# Patient Record
Sex: FEMALE | Race: OTHER | NOT HISPANIC OR LATINO | Employment: FULL TIME | URBAN - METROPOLITAN AREA
[De-identification: names, ages, dates, MRNs, and addresses within clinical notes are randomized per-mention and may not be internally consistent; named-entity substitution may affect disease eponyms.]

---

## 2022-09-06 ENCOUNTER — TELEPHONE (OUTPATIENT)
Dept: HEMATOLOGY/ONCOLOGY | Facility: CLINIC | Age: 32
End: 2022-09-06
Payer: COMMERCIAL

## 2022-09-06 NOTE — TELEPHONE ENCOUNTER
Attempted to call pt with the assistance of  FACM & discuss appt scheduled later this week. No answer, no voicemail.  notified that we are working on it.

## 2022-09-08 NOTE — TELEPHONE ENCOUNTER
First call, no answer. (ID [id number])tried a 2nd time & pt answered. Explained that  does not treat her type of cancer & that we needed to schedule with a different provider. Pt was concerned because she doesn't speak English. Informed her that we will request an  for her visit. Scheduled with  on 9/21/22 at 10am. Also provided my email so that she can send me the records she has & we can submit them to the International [facility] to interpret for the visit. Pt states she does not have any family or friends that can accompany her & they do not speak English either. All questions answered.

## 2022-09-21 ENCOUNTER — TELEPHONE (OUTPATIENT)
Dept: OTOLARYNGOLOGY | Facility: CLINIC | Age: 32
End: 2022-09-21
Payer: COMMERCIAL

## 2022-09-21 ENCOUNTER — OFFICE VISIT (OUTPATIENT)
Dept: HEMATOLOGY/ONCOLOGY | Facility: CLINIC | Age: 32
End: 2022-09-21
Payer: COMMERCIAL

## 2022-09-21 VITALS
RESPIRATION RATE: 18 BRPM | OXYGEN SATURATION: 96 % | DIASTOLIC BLOOD PRESSURE: 60 MMHG | TEMPERATURE: 98 F | WEIGHT: 92.13 LBS | SYSTOLIC BLOOD PRESSURE: 123 MMHG | HEART RATE: 72 BPM

## 2022-09-21 DIAGNOSIS — Z76.89 ENCOUNTER TO ESTABLISH CARE: ICD-10-CM

## 2022-09-21 DIAGNOSIS — Z85.819 HISTORY OF NASOPHARYNGEAL CANCER: Primary | ICD-10-CM

## 2022-09-21 PROCEDURE — 3074F SYST BP LT 130 MM HG: CPT | Mod: CPTII,S$GLB,, | Performed by: STUDENT IN AN ORGANIZED HEALTH CARE EDUCATION/TRAINING PROGRAM

## 2022-09-21 PROCEDURE — 99999 PR PBB SHADOW E&M-EST. PATIENT-LVL III: ICD-10-PCS | Mod: PBBFAC,,, | Performed by: STUDENT IN AN ORGANIZED HEALTH CARE EDUCATION/TRAINING PROGRAM

## 2022-09-21 PROCEDURE — 99999 PR PBB SHADOW E&M-EST. PATIENT-LVL III: CPT | Mod: PBBFAC,,, | Performed by: STUDENT IN AN ORGANIZED HEALTH CARE EDUCATION/TRAINING PROGRAM

## 2022-09-21 PROCEDURE — 3074F PR MOST RECENT SYSTOLIC BLOOD PRESSURE < 130 MM HG: ICD-10-PCS | Mod: CPTII,S$GLB,, | Performed by: STUDENT IN AN ORGANIZED HEALTH CARE EDUCATION/TRAINING PROGRAM

## 2022-09-21 PROCEDURE — 99205 OFFICE O/P NEW HI 60 MIN: CPT | Mod: S$GLB,,, | Performed by: STUDENT IN AN ORGANIZED HEALTH CARE EDUCATION/TRAINING PROGRAM

## 2022-09-21 PROCEDURE — 3078F PR MOST RECENT DIASTOLIC BLOOD PRESSURE < 80 MM HG: ICD-10-PCS | Mod: CPTII,S$GLB,, | Performed by: STUDENT IN AN ORGANIZED HEALTH CARE EDUCATION/TRAINING PROGRAM

## 2022-09-21 PROCEDURE — 99205 PR OFFICE/OUTPT VISIT, NEW, LEVL V, 60-74 MIN: ICD-10-PCS | Mod: S$GLB,,, | Performed by: STUDENT IN AN ORGANIZED HEALTH CARE EDUCATION/TRAINING PROGRAM

## 2022-09-21 PROCEDURE — 3078F DIAST BP <80 MM HG: CPT | Mod: CPTII,S$GLB,, | Performed by: STUDENT IN AN ORGANIZED HEALTH CARE EDUCATION/TRAINING PROGRAM

## 2022-09-21 NOTE — PROGRESS NOTES
Hematology- Oncology Clinic Note :     9/21/2022    Chief Complaint   Patient presents with    Advice Only    Establish Care    nasopharyngeal tumor     Cantonese  used during visit      Reason for referral: Surveillance for nasopharyngeal tumor      HPI    Pt is a 33 yo Cantonese speaking female with pmhx of nasopharyngeal tumor (unknown stage and pathology) who presents as a referral from PCP to establish care for oncology surveillance.  Per pt she was treated in [place] after she was diagnosed with the nasopharyngeal tumor around August 2017.  Per pt she had 3 months of chemo and XRT and was on surveillance until she left [facility] in 2020.  Few records have been received (in media) but pt states she has not been informed of a recurrence and denied any alarm symptoms.  [place] has not [place] in [place] due [place] concerns but would like to re establish follow up.   was used during visit.  Pt agreeable to ENT referral but informed that we will need additional records from [place] in order to have a better understanding of how we will monitor her as we do not have any path or staging/tx information.  Pt initially agreeable for us to obtain records but later refused as she did not want to give out additional information despite being informed that it is necessary to obtain these records as it could affect our tx or surveillance plan.  Pt agreeable for ENT follow up so far.          Pmhx: As above  Fmhx: Lung cancer  Social Hx: Denies  Surgical hx: Denies    Active Problem List with Overview Notes    Diagnosis Date Noted    Encounter to establish care 09/21/2022       Patient Active Problem List    Diagnosis Date Noted    Encounter to establish care 09/21/2022     No past medical history on file.   No past surgical history on file.   (Not in a hospital admission)    Review of patient's allergies indicates:  Not on File   Social History     Tobacco Use    Smoking status: Not on file     Smokeless tobacco: Not on file   Substance Use Topics    Alcohol use: Not on file      No family history on file.     Review of Systems :  Review of Systems   Constitutional:  Negative for chills, diaphoresis, fever, malaise/fatigue and weight loss.   HENT:  Negative for congestion, hearing loss and nosebleeds.    Eyes:  Negative for blurred vision, double vision, pain, discharge and redness.   Respiratory:  Negative for cough and shortness of breath.    Cardiovascular:  Negative for chest pain.   Gastrointestinal:  Negative for abdominal pain and heartburn.   Genitourinary:  Negative for dysuria.   Musculoskeletal:  Negative for joint pain and myalgias.   Skin:  Negative for itching and rash.   Neurological:  Negative for dizziness and headaches.   Endo/Heme/Allergies:  Does not bruise/bleed easily.   Psychiatric/Behavioral:  The patient is nervous/anxious.      Physical Exam :  Wt Readings from Last 3 Encounters:   09/21/22 41.8 kg (92 lb 2.4 oz)     Temp Readings from Last 3 Encounters:   09/21/22 98.4 °F (36.9 °C) (Oral)     BP Readings from Last 3 Encounters:   09/21/22 123/60     Pulse Readings from Last 3 Encounters:   09/21/22 72     There is no height or weight on file to calculate BMI.    Physical Exam  Vitals reviewed.   Constitutional:       Appearance: Normal appearance.   HENT:      Head: Normocephalic and atraumatic.      Nose: Nose normal.      Mouth/Throat:      Mouth: Mucous membranes are moist.   Eyes:      General: No scleral icterus.        Right eye: No discharge.         Left eye: No discharge.      Pupils: Pupils are equal, round, and reactive to light.   Cardiovascular:      Rate and Rhythm: Normal rate and regular rhythm.      Heart sounds: Normal heart sounds.   Pulmonary:      Effort: Pulmonary effort is normal.      Breath sounds: Normal breath sounds.   Abdominal:      General: Abdomen is flat.      Palpations: Abdomen is soft.   Musculoskeletal:         General: Normal range of motion.       Cervical back: Normal range of motion and neck supple.   Skin:     General: Skin is warm and dry.   Neurological:      Mental Status: She is oriented to person, place, and time.   Psychiatric:         Mood and Affect: Mood normal.         Behavior: Behavior normal.         Thought Content: Thought content normal.         Judgment: Judgment normal.         Pertinent Diagnostic studies:    No results found for this or any previous visit (from the past 24 hour(s)).    Assessment/Plan :     ECOG 0    Hx of nasopharyngeal tumor  -Dx and tx with chemo/XRT in [place] (unknown stage and tx) in Aug 2017 per pt with 1  -Was on surveillance until [place] when [place] move to [place]  -Few records available (in media) but no evidence of disease on CT on 2/2020 but significant for small nodules in lungs  -Pt refuses to have us obtain additional records for now despite being told of the importance of obtaining records but agreeable to ENT follow up  -RTC in 3 weeks for MD visit    Summary of orders placed this encounter:  Orders Placed This Encounter   Procedures    Ambulatory referral/consult to ENT       No future appointments.        Jeanette Khanna MD   Hematology/oncology

## 2023-04-20 ENCOUNTER — OFFICE VISIT (OUTPATIENT)
Dept: HEMATOLOGY/ONCOLOGY | Facility: CLINIC | Age: 33
End: 2023-04-20
Payer: COMMERCIAL

## 2023-04-20 VITALS
HEART RATE: 67 BPM | DIASTOLIC BLOOD PRESSURE: 59 MMHG | WEIGHT: 92.81 LBS | OXYGEN SATURATION: 100 % | BODY MASS INDEX: 17.52 KG/M2 | SYSTOLIC BLOOD PRESSURE: 113 MMHG | HEIGHT: 61 IN

## 2023-04-20 DIAGNOSIS — K08.9 POOR DENTITION: ICD-10-CM

## 2023-04-20 DIAGNOSIS — Z09 FOLLOW-UP EXAM: ICD-10-CM

## 2023-04-20 DIAGNOSIS — Z85.819 HISTORY OF NASOPHARYNGEAL CANCER: Primary | ICD-10-CM

## 2023-04-20 DIAGNOSIS — H65.22 LEFT CHRONIC SEROUS OTITIS MEDIA: ICD-10-CM

## 2023-04-20 PROCEDURE — 99214 OFFICE O/P EST MOD 30 MIN: CPT | Mod: S$GLB,,, | Performed by: STUDENT IN AN ORGANIZED HEALTH CARE EDUCATION/TRAINING PROGRAM

## 2023-04-20 PROCEDURE — 3078F DIAST BP <80 MM HG: CPT | Mod: CPTII,S$GLB,, | Performed by: STUDENT IN AN ORGANIZED HEALTH CARE EDUCATION/TRAINING PROGRAM

## 2023-04-20 PROCEDURE — 99999 PR PBB SHADOW E&M-EST. PATIENT-LVL III: ICD-10-PCS | Mod: PBBFAC,,, | Performed by: STUDENT IN AN ORGANIZED HEALTH CARE EDUCATION/TRAINING PROGRAM

## 2023-04-20 PROCEDURE — 3074F SYST BP LT 130 MM HG: CPT | Mod: CPTII,S$GLB,, | Performed by: STUDENT IN AN ORGANIZED HEALTH CARE EDUCATION/TRAINING PROGRAM

## 2023-04-20 PROCEDURE — 3008F BODY MASS INDEX DOCD: CPT | Mod: CPTII,S$GLB,, | Performed by: STUDENT IN AN ORGANIZED HEALTH CARE EDUCATION/TRAINING PROGRAM

## 2023-04-20 PROCEDURE — 3078F PR MOST RECENT DIASTOLIC BLOOD PRESSURE < 80 MM HG: ICD-10-PCS | Mod: CPTII,S$GLB,, | Performed by: STUDENT IN AN ORGANIZED HEALTH CARE EDUCATION/TRAINING PROGRAM

## 2023-04-20 PROCEDURE — 99999 PR PBB SHADOW E&M-EST. PATIENT-LVL III: CPT | Mod: PBBFAC,,, | Performed by: STUDENT IN AN ORGANIZED HEALTH CARE EDUCATION/TRAINING PROGRAM

## 2023-04-20 PROCEDURE — 3008F PR BODY MASS INDEX (BMI) DOCUMENTED: ICD-10-PCS | Mod: CPTII,S$GLB,, | Performed by: STUDENT IN AN ORGANIZED HEALTH CARE EDUCATION/TRAINING PROGRAM

## 2023-04-20 PROCEDURE — 99214 PR OFFICE/OUTPT VISIT, EST, LEVL IV, 30-39 MIN: ICD-10-PCS | Mod: S$GLB,,, | Performed by: STUDENT IN AN ORGANIZED HEALTH CARE EDUCATION/TRAINING PROGRAM

## 2023-04-20 PROCEDURE — 3074F PR MOST RECENT SYSTOLIC BLOOD PRESSURE < 130 MM HG: ICD-10-PCS | Mod: CPTII,S$GLB,, | Performed by: STUDENT IN AN ORGANIZED HEALTH CARE EDUCATION/TRAINING PROGRAM

## 2023-04-20 RX ORDER — CETIRIZINE HYDROCHLORIDE 10 MG/1
10 TABLET ORAL DAILY
Qty: 30 TABLET | Refills: 1 | Status: SHIPPED | OUTPATIENT
Start: 2023-04-20 | End: 2024-04-19

## 2023-04-20 NOTE — Clinical Note
-Follow up with ENT ASAP -Pt wants to return to [facility] so please have pt see Dr. Azul for oncology in 2 months and get ENT there

## 2023-04-20 NOTE — PROGRESS NOTES
Hematology- Oncology Clinic Note :     4/20/2023    Chief Complaint   Patient presents with    Follow-up    hx of nasopharyngeal cancer     Cantonese  used during visit      Reason for referral: Surveillance for nasopharyngeal tumor      HPI    Pt is a 31 yo Cantonese speaking female with pmhx of nasopharyngeal tumor (unknown stage and pathology) who presents as a referral from PCP to establish care for oncology surveillance.  Per pt she was treated in [place] after she was diagnosed with the nasopharyngeal tumor around August 2017.  Per pt she had 3 months of chemo and XRT and was on surveillance until she left [facility] in 2020.  Few records have been received (in media).  Interval hx:    Pt wanted to hold off on follow up but presents again to clinic and would like to resume follow up with med/onc and ENT.  Due to drive pt would like to see med/onc and ENT providers at [facility] in the future.  Pt's main issue today was mild discomfort from poor dentition and plans to have teeth pulled and dental implants placed.  No other issues at time of exam apart from seasonal allergies, zyrtec prescribed.  Earwax noted on exam.        Pmhx: As above  Fmhx: Lung cancer  Social Hx: Denies  Surgical hx: Denies    Active Problem List with Overview Notes    Diagnosis Date Noted    Follow-up exam 09/21/2022       Patient Active Problem List    Diagnosis Date Noted    Follow-up exam 09/21/2022     History reviewed. No pertinent past medical history.   No past surgical history on file.   (Not in a hospital admission)      Review of patient's allergies indicates:  No Known Allergies   Social History     Tobacco Use    Smoking status: Never    Smokeless tobacco: Not on file   Substance Use Topics    Alcohol use: Not on file      No family history on file.     Review of Systems :  Review of Systems   Constitutional:  Negative for chills, diaphoresis, fever, malaise/fatigue and weight loss.   HENT:  Negative for congestion,  hearing loss and nosebleeds.    Eyes:  Negative for blurred vision, double vision, pain, discharge and redness.   Respiratory:  Negative for cough and shortness of breath.    Cardiovascular:  Negative for chest pain.   Gastrointestinal:  Negative for abdominal pain and heartburn.   Genitourinary:  Negative for dysuria.   Musculoskeletal:  Negative for joint pain and myalgias.   Skin:  Negative for itching and rash.   Neurological:  Negative for dizziness and headaches.   Endo/Heme/Allergies:  Positive for environmental allergies. Does not bruise/bleed easily.   Psychiatric/Behavioral:  The patient is not nervous/anxious.      Physical Exam :  Wt Readings from Last 3 Encounters:   04/20/23 42.1 kg (92 lb 13 oz)   01/25/23 42.6 kg (94 lb)   01/17/23 42.6 kg (94 lb)     Temp Readings from Last 3 Encounters:   01/25/23 97.8 °F (36.6 °C) (Temporal)   01/17/23 98.7 °F (37.1 °C) (Temporal)   09/21/22 98.4 °F (36.9 °C) (Oral)     BP Readings from Last 3 Encounters:   04/20/23 (!) 113/59   01/25/23 (!) 87/44   01/17/23 (!) 88/58     Pulse Readings from Last 3 Encounters:   04/20/23 67   01/25/23 61   01/17/23 79     Body mass index is 17.54 kg/m².    Physical Exam  Vitals reviewed.   Constitutional:       Appearance: Normal appearance.   HENT:      Head: Normocephalic and atraumatic.      Right Ear: There is impacted cerumen.      Left Ear: There is impacted cerumen.      Nose: Nose normal.      Mouth/Throat:      Mouth: Mucous membranes are moist.   Eyes:      General: No scleral icterus.        Right eye: No discharge.         Left eye: No discharge.      Pupils: Pupils are equal, round, and reactive to light.   Cardiovascular:      Rate and Rhythm: Normal rate and regular rhythm.      Heart sounds: Normal heart sounds.   Pulmonary:      Effort: Pulmonary effort is normal.      Breath sounds: Normal breath sounds.   Abdominal:      General: Abdomen is flat.      Palpations: Abdomen is soft.   Musculoskeletal:          General: Normal range of motion.      Cervical back: Normal range of motion and neck supple.   Skin:     General: Skin is warm and dry.   Neurological:      Mental Status: She is oriented to person, place, and time.   Psychiatric:         Mood and Affect: Mood normal.         Behavior: Behavior normal.         Thought Content: Thought content normal.         Judgment: Judgment normal.         Pertinent Diagnostic studies:    No results found for this or any previous visit (from the past 24 hour(s)).    Assessment/Plan :     ECOG 0    Hx of nasopharyngeal tumor  -Dx and tx with chemo/XRT in [place] (unknown stage and tx) in Aug 2017 per pt with 1  -Was on surveillance [place]  -Few records available (in media) but no evidence of disease on CT on 2/2020 but significant for small nodules in lungs  -Pt agreeable now for ENT follow up, will schedule  -RTC in 2 months after ENT visit with med/onc at [facility], per pt request she now wants as much done as possible at [facility]      Poor dentition  -Pt seeing dentist and is considering dental implants  -From a med onc perspective ok as long as ENT eval ok      Time spent on encounter: 40 minutes      Summary of orders placed this encounter:  Orders Placed This Encounter   Procedures    Ambulatory referral/consult to ENT       No future appointments.        Jeanette Khanna MD   Hematology/oncology

## 2023-05-03 ENCOUNTER — OFFICE VISIT (OUTPATIENT)
Dept: OTOLARYNGOLOGY | Facility: CLINIC | Age: 33
End: 2023-05-03
Payer: COMMERCIAL

## 2023-05-03 VITALS — DIASTOLIC BLOOD PRESSURE: 76 MMHG | SYSTOLIC BLOOD PRESSURE: 124 MMHG | HEART RATE: 68 BPM

## 2023-05-03 DIAGNOSIS — Z92.3 HISTORY OF RADIATION THERAPY: ICD-10-CM

## 2023-05-03 DIAGNOSIS — Z85.819 HISTORY OF NASOPHARYNGEAL CANCER: Primary | ICD-10-CM

## 2023-05-03 DIAGNOSIS — Z92.21 HISTORY OF CHEMOTHERAPY: ICD-10-CM

## 2023-05-03 DIAGNOSIS — H72.91 PERFORATION OF RIGHT TYMPANIC MEMBRANE: ICD-10-CM

## 2023-05-03 PROCEDURE — 1159F MED LIST DOCD IN RCRD: CPT | Mod: CPTII,S$GLB,, | Performed by: OTOLARYNGOLOGY

## 2023-05-03 PROCEDURE — 99204 PR OFFICE/OUTPT VISIT, NEW, LEVL IV, 45-59 MIN: ICD-10-PCS | Mod: 25,S$GLB,, | Performed by: OTOLARYNGOLOGY

## 2023-05-03 PROCEDURE — 1160F RVW MEDS BY RX/DR IN RCRD: CPT | Mod: CPTII,S$GLB,, | Performed by: OTOLARYNGOLOGY

## 2023-05-03 PROCEDURE — 99999 PR PBB SHADOW E&M-EST. PATIENT-LVL III: ICD-10-PCS | Mod: PBBFAC,,, | Performed by: OTOLARYNGOLOGY

## 2023-05-03 PROCEDURE — 92511 NASOPHARYNGOSCOPY: CPT | Mod: S$GLB,,, | Performed by: OTOLARYNGOLOGY

## 2023-05-03 PROCEDURE — 3074F SYST BP LT 130 MM HG: CPT | Mod: CPTII,S$GLB,, | Performed by: OTOLARYNGOLOGY

## 2023-05-03 PROCEDURE — 1159F PR MEDICATION LIST DOCUMENTED IN MEDICAL RECORD: ICD-10-PCS | Mod: CPTII,S$GLB,, | Performed by: OTOLARYNGOLOGY

## 2023-05-03 PROCEDURE — 1160F PR REVIEW ALL MEDS BY PRESCRIBER/CLIN PHARMACIST DOCUMENTED: ICD-10-PCS | Mod: CPTII,S$GLB,, | Performed by: OTOLARYNGOLOGY

## 2023-05-03 PROCEDURE — 99204 OFFICE O/P NEW MOD 45 MIN: CPT | Mod: 25,S$GLB,, | Performed by: OTOLARYNGOLOGY

## 2023-05-03 PROCEDURE — 3074F PR MOST RECENT SYSTOLIC BLOOD PRESSURE < 130 MM HG: ICD-10-PCS | Mod: CPTII,S$GLB,, | Performed by: OTOLARYNGOLOGY

## 2023-05-03 PROCEDURE — 99999 PR PBB SHADOW E&M-EST. PATIENT-LVL III: CPT | Mod: PBBFAC,,, | Performed by: OTOLARYNGOLOGY

## 2023-05-03 PROCEDURE — 3078F PR MOST RECENT DIASTOLIC BLOOD PRESSURE < 80 MM HG: ICD-10-PCS | Mod: CPTII,S$GLB,, | Performed by: OTOLARYNGOLOGY

## 2023-05-03 PROCEDURE — 92511 PR NASOPHARYNGOSCOPY: ICD-10-PCS | Mod: S$GLB,,, | Performed by: OTOLARYNGOLOGY

## 2023-05-03 PROCEDURE — 3078F DIAST BP <80 MM HG: CPT | Mod: CPTII,S$GLB,, | Performed by: OTOLARYNGOLOGY

## 2023-05-03 NOTE — PROGRESS NOTES
History of Present Illness:   Elsy Cortez is a 32 y.o. year old female evaluated on 5/4/2023, in the Otolaryngology-Head and Neck Surgery Clinic at [facility]. The patient was referred by Dr. Khanna for evaluation for surveillance for nasopharyngeal cancer.  Interview was done with .  She had history of nasopharyngeal cancer of unknown stage treated in [place] about 5 years ago with chemo and radiation.  She denies any recurrent nasal obstruction.  She denies any fullness in the ears or popping but she does have occasional irritation and drainage from the right ear.  He feels with the dry weather she is had more sneezing and sometimes she will have a spot of blood but has not had any significant nasal bleed or midface pressure.  She occasionally uses over-the-counter nasal sprays but not routinely.           Past Medical/Surgical History  History reviewed. No pertinent past medical history.  Her  has no past surgical history on file.     Past Family/Social History  Her family history is not on file.  She  reports that she has never smoked. She does not have any smokeless tobacco history on file. She reports that she does not use drugs.     Medications/Allergies/Immunizations  Her current medication(s) include:   Current Outpatient Medications   Medication Sig Dispense Refill    amoxicillin-clavulanate 500-125mg (AUGMENTIN) 500-125 mg Tab Take 1 tablet (500 mg total) by mouth 2 (two) times daily. 10 tablet 0    cetirizine (ZYRTEC) 10 MG tablet Take 1 tablet (10 mg total) by mouth once daily. 30 tablet 1    ciprofloxacin-dexAMETHasone 0.3-0.1% (CIPRODEX) 0.3-0.1 % DrpS Place 4 drops into both ears 2 (two) times daily. 7.5 mL 0     No current facility-administered medications for this visit.        Allergies: Patient has no known allergies.     Immunizations:   There is no immunization history on file for this patient.      Review of Systems   Constitutional: Negative for fever, weight loss and  weight gain.  Skin: Negative for rash, itchiness, dryness  HENT:  As per HPI  Cardiovascular: Negative for chest pain and dyspnea on exertion .   Respiratory: Is not experiencing shortness of breath.   Gastrointestinal: Negative for nausea and vomiting.   Neurological: Negative for headaches.   Lymph/Heme: Negative for lymphadenopathy or easy bruising  Musculoskeletal: Negative for joint or muscle pain  Psychiatric: The patient is not nervous/anxious.        All other systems are negative except for that listed in the HPI.      PHYSICAL EXAM:   Vital Signs:  /76 (Patient Position: Sitting)   Pulse 68   LMP 02/28/2023      General:  Well-developed, well-nourished  Communication and Voice:  Clear pitch and clarity  Hearing: Hearing adequate for verbal communication bilaterally   Inspection:  Normocephalic and atraumatic without mass or lesion  Palpation:  Facial skeleton intact without bony stepoffs  Parotid Glands:  No mass or tenderness  Facial Strength:  Facial motility symmetric and full bilaterally  Pinna:  External ear intact and fully developed  External canal:  Canal is patent with intact skin  Tympanic Membrane:  Clear on the left side on the right she has a tympanic membrane perforation with dry middle ear on exam today  External nose:  No scar or anatomic deformity  Internal Nose:  Septum intact and midline.  No edema, polyp, or rhinorrhea.  TMJ:  No pain to palpation with full mobility  Oral cavity, Lips, Teeth, and Gums:  Mucosa and teeth intact and viable, No lesions, masses or ulcers  Oropharynx: No erythema or exudate, no masses or ulcerations, non-obstructive tonsils  Nasopharynx:  No mass or lesion with intact mucosa  Hypopharynx:  Not well visualized secondary to gagging  Larynx:  Not well visualized secondary to gagging  Neck, Trachea, Lymphatics:  Midline trachea without mass or lesion, no lymphadenopathy  Thyroid:  No mass or nodularity  Eyes: No nystagmus with equal extraocular motion  bilaterally  Neuro/Psych/Balance: Patient oriented and appropriate in interaction;  Appropriate mood and affect;  Gait is intact with no imbalance; Cranial nerves I-XII are intact  Respiratory effort:  Equal inspiration and expiration without stridor  Peripheral Vascular:  Warm extremities with equal pulses     Procedure: Flexible fiberoptic nasopharyngoscopy/laryngoscopy   Indications: Need for detailed exam, hyperactive gag reflex, inadequate mirror visualization.  Surgeon: Salina Cormier MD  Procedure note/findings: After informed discussion of the risks, benefits, and alternatives after indications as noted above, a fiberoptic nasopharyngoscopy and laryngoscopy was recommended for above indications, and the patient consented to this. Nasal cavities were topically anesthetized and decongested with 4% lidocaine and Afrin solutions after which a flexible scope was easily advanced without difficulty into the left and right nasal cavities as well as into the nasopharynx. Nasal cavities were intact without gross mass or lesion. Nasopharynx, similarly, revealed no mass lesion or granularity. There was no ulceration. There was no gross asymmetry. Fossa of Rosenmueller was intact bilaterally. The eustachian tube orifices were intact bilaterally and patent. Posterior and lateral nasal pharyngeal walls were intact and symmetric without ulceration, mass, or granularity.  She has postradiation changes with some thick mucus and mucosal dryness but no mass or ulceration. She tolerated this well.         RADIOLOGIC REVIEW:    Review of translated outside records shows both long and head and neck imaging from February of 2020 with no evidence of residual disease.  She did have a small pulmonary nodule noted for follow-up.    She also brings outside lab report with EBV IgG and IgM titers that are elevated    ASSESSMENT:   1. History of nasopharyngeal cancer    2. Perforation of right tympanic membrane    3. History of chemotherapy     4. History of radiation therapy            PLAN:   Patient is approximately 4-5 years out from nasopharyngeal cancer treated with chemo and radiation.  She has no evidence of disease on scope exam and physical exam today.  I will defer to Dr. Khanna for repeat lung imaging for pulmonary nodule.  I discussed with her lab findings of EBV immunoglobulin titers and that this is not really specific or indicating recurrence.  TSH was checked recently.  I will see her back in about a year for repeat scope exam.  After that she can be discharged as she will have been more than 5 years from cancer treatment.    I believe that Ms. Cortez has a good understanding of the issues involved and I answered all of her questions.     DISCLAIMER: This note was prepared with [employer] voice recognition transcription software. Garbled syntax, mangled pronouns, and other bizarre constructions may be attributed to that software system. While efforts were made to correct any mistakes made by this voice recognition program, some errors and/or omissions may remain in the note that were missed when the note was originally created.

## 2023-07-24 PROBLEM — Z09 FOLLOW-UP EXAM: Status: RESOLVED | Noted: 2022-09-21 | Resolved: 2023-07-24
